# Patient Record
Sex: FEMALE | Race: OTHER
[De-identification: names, ages, dates, MRNs, and addresses within clinical notes are randomized per-mention and may not be internally consistent; named-entity substitution may affect disease eponyms.]

---

## 2017-12-07 ENCOUNTER — HOSPITAL ENCOUNTER (OUTPATIENT)
Dept: HOSPITAL 25 - OREAST | Age: 32
Discharge: HOME | End: 2017-12-07
Attending: ORTHOPAEDIC SURGERY
Payer: COMMERCIAL

## 2017-12-07 VITALS — DIASTOLIC BLOOD PRESSURE: 78 MMHG | SYSTOLIC BLOOD PRESSURE: 131 MMHG

## 2017-12-07 DIAGNOSIS — I10: ICD-10-CM

## 2017-12-07 DIAGNOSIS — E03.9: ICD-10-CM

## 2017-12-07 DIAGNOSIS — G56.01: Primary | ICD-10-CM

## 2017-12-07 DIAGNOSIS — Z87.891: ICD-10-CM

## 2017-12-07 DIAGNOSIS — G62.9: ICD-10-CM

## 2017-12-07 PROCEDURE — 81025 URINE PREGNANCY TEST: CPT

## 2017-12-13 NOTE — OP
DATE OF OPERATION:  12/07/17 [facility]

 

DATE OF BIRTH:  [birth year]

 

SURGEON:  Addy Elizondo MD.

 

ASSISTANT:  YUMI Cardona.

 

ANESTHESIOLOGIST: 

 

ANESTHESIA:  Local MAC.

 

PRE-OP DIAGNOSIS:  Right carpal tunnel syndrome.

 

POST-OP DIAGNOSIS:  Right carpal tunnel syndrome.

 

OPERATIVE PROCEDURE:  Right carpal tunnel release.

 

INDICATIONS:  Hermila had progressive carpal tunnel syndrome.  We talked about 
risks and benefits, she wanted to proceed.

 

FINDINGS:  As expected.

 

ESTIMATED BLOOD LOSS:  2 mL.

 

COMPLICATIONS:  None.

 

DESCRIPTION OF PROCEDURE:  Hermila was seen in the preoperative area.  The 
correct site, side, and procedure were identified.  We came back to the 
operating room. The arm was prepped and draped.  A time-out was performed.

 

I made a 2 to 3-cm longitudinal incision in the standard location for an open 
carpal tunnel release.  Dissection was carried down through the subcutaneous 
tissue in the palmar fascia.  Transverse carpal ligament was released off the 
radial aspect of the hook of the hamate.  The release was completed distally.  
Proximally, the subcutaneous tissue and fascia was released and retracted with 
a Sheryl retractor.  The remainder of the transverse carpal ligament and distal 
antebrachial fascia was released with tenotomy scissors under direct 
visualization.  I checked the release, once it was complete distally and 
proximally and there was absolutely no compression on the nerve, I irrigated 
out the wound.  Skin was closed with 3-0 Monocryl suture.  Wound was dressed 
with Xeroform, 4x4s, sterile Webril, and an Ace bandage.  She was then woken up 
and taken to recovery room in stable condition.

 

 [id number]/CPS #: [id number]

[facility]

## 2020-03-18 ENCOUNTER — HOSPITAL ENCOUNTER (EMERGENCY)
Dept: HOSPITAL 25 - UCCORT | Age: 35
Discharge: HOME | End: 2020-03-18
Payer: COMMERCIAL

## 2020-03-18 VITALS — DIASTOLIC BLOOD PRESSURE: 95 MMHG | SYSTOLIC BLOOD PRESSURE: 167 MMHG

## 2020-03-18 DIAGNOSIS — J06.9: Primary | ICD-10-CM

## 2020-03-18 DIAGNOSIS — Z87.891: ICD-10-CM

## 2020-03-18 DIAGNOSIS — R10.31: ICD-10-CM

## 2020-03-18 PROCEDURE — 99212 OFFICE O/P EST SF 10 MIN: CPT

## 2020-03-18 PROCEDURE — G0463 HOSPITAL OUTPT CLINIC VISIT: HCPCS

## 2020-03-18 NOTE — UC
Throat Pain/Nasal Jesus HPI





- HPI Summary


HPI Summary: 


34-year-old woman comes in with a chief complaint of upper respiratory tract 

infection symptoms for one week.  She's had green rhinorrhea or sinus pressure 

cough chest congestion.  Occasional fevers.  Started her menstrual period 

yesterday.  She started with pelvic cramping that radiates in the midline to 

the level of the umbilicus and also bilaterally.  Pain is intermittent.  At 

this time at 2 out of 10 minutes at its worse is a 10 out of 10.  Location and 

character of the pain is like her menstrual cramps but these are worse than 

usual.  She took some ibuprofen last night which did help with the pain. The 

last period was in February 2020 and the timing is normal.  Patient denies any 

chance of being pregnant.  No burning with urination.  No abnormal vaginal 

discharge.  No concern of STI or yeast infection.





- History of Current Complaint


Chief Complaint: UCAbdominalPain


Stated Complaint: COUGH,SOB,CONGESTION,LOWER ABD PAIN


Time Seen by Provider: 03/18/20 09:16


Hx Last Menstrual Period: 3/17/2020


Pain Intensity: 6





- Allergies/Home Medications


Allergies/Adverse Reactions: 


 Allergies











Allergy/AdvReac Type Severity Reaction Status Date / Time


 


No Known Allergies Allergy   Verified 03/18/20 08:51











Home Medications: 


 Home Medications





Amoxicillin PO (*) [Amoxicillin 875 MG (*)] 875 mg PO BID #20 tab 03/18/20 [Rx]


Ibuprofen TAB* [Advil TAB*] 600 mg PO Q6H PRN 03/18/20 [History Confirmed 03/18/ 20]











PMH/Surg Hx/FS Hx/Imm Hx


Previously Healthy: Yes





- Surgical History


Surgical History: Yes


Surgery Procedure, Year, and Place: Right Carpal Tunnel Release, 2017, [place]





- Family History


Known Family History: Positive: None





- Social History


Alcohol Use: None


Alcohol Amount: 2 DRINKS/YR


Substance Use Type: None


Smoking Status (MU): Former Smoker


Type: Cigarettes


Amount Used/How Often: 2 CIGARETTES/DAY FOR 2 YRS


Length of Time of Smoking/Using Tobacco: 1 pack per week x 3 Years


Have You Smoked in the Last Year: No


When Did the Patient Quit Smoking/Using Tobacco: ~2013


Household Exposure Type: Cigarettes





Review of Systems


All Other Systems Reviewed And Are Negative: Yes


Constitutional: Positive: Other - SEE HPI


Skin: Positive: Negative


Eyes: Positive: Negative


ENT: Positive: Nasal Discharge, Sinus Congestion


Respiratory: Positive: Cough


Cardiovascular: Positive: Negative


Gastrointestinal: Positive: Abdominal Pain


Genitourinary: Positive: Negative


Motor: Positive: Negative


Neurovascular: Positive: Negative


Musculoskeletal: Positive: Negative


Neurological/Mental Status: Positive: Negative


Psychological: Positive: Negative


Is Patient Immunocompromised?: No





Physical Exam


Triage Information Reviewed: Yes


Appearance: No Pain Distress, Well-Nourished, Ill-Appearing - MILD


Vital Signs: 


 Initial Vital Signs











Temp  99.3 F   03/18/20 08:48


 


Pulse  108   03/18/20 08:48


 


Resp  26   03/18/20 08:48


 


BP  167/95   03/18/20 08:48


 


Pulse Ox  100   03/18/20 08:48











Vital Signs Reviewed: Yes


Eye Exam: Normal


Eyes: Positive: Conjunctiva Clear


ENT: Positive: Pharyngeal erythema, Nasal congestion, Nasal drainage, TMs normal


Neck: Positive: Supple


Respiratory: Positive: Lungs clear, Normal breath sounds, No respiratory 

distress


Cardiovascular: Positive: RRR


Abdomen Description: Positive: Other: - Mild tenderness to palpation the 

midline over the suprapubic area and also on the right lower quadrant.  No 

rebound.  Positive bowel sounds.


Bowel Sounds: Positive: Present


Musculoskeletal: Positive: Strength Intact, ROM Intact


Neurological: Positive: Muscle Tone Normal


Psychological: Positive: Age Appropriate Behavior


Skin Exam: Normal





Throat Pain/Nasal Course/Dx





- Course


Course Of Treatment: 





DISCUSSED VIRAL VERSES BACTERIAL INFECTIONS AND THE ROLE OF ANTIBIOTICS.


THE PATIENT PREFERS TO BE ON ANTIBIOTICS AT THIS TIME.





Discussed the potential causes of the abdominal pain with the patient.  Pain is 

intermittent and bilateral and eating and drinking and bowels have not changes 

therefore appendicitis is unlikely at this time.  Also ovarian cyst or torsion 

is unlikely given that the pain is bilateral.  Ectopic pregnancy is not likely 

given that the patient has not had any sexual activity with male.  No UTI 

symptoms.  No STI symptoms.  Most probable cause of the pelvic pain is her 

menstrual cramps that are worse than usual.  We discussed all of this and the 

plan is to treat the abdominal pain with ibuprofen but if anything gets worse 

such as fevers increased her pain but she pain in the right lower quadrant or 

persistent pain or vomiting patient's crit emergency department for further 

evaluation and care.





- Differential Dx/Diagnosis


Provider Diagnosis: 


 Upper respiratory infection, Abdominal pain








Discharge ED





- Sign-Out/Discharge


Documenting (check all that apply): Patient Departure


All imaging exams completed and their final reports reviewed: No Studies





- Discharge Plan


Condition: Stable


Disposition: HOME


Prescriptions: 


Amoxicillin PO (*) [Amoxicillin 875 MG (*)] 875 mg PO BID #20 tab


Patient Education Materials:  Upper Respiratory Infection (ED), Acute Abdominal 

Pain (ED)


Forms:  *Work Release


Referrals: 


Chantell Yousif PA [Primary Care Provider] - 


Additional Instructions: 


FOLLOW UP WITH YOUR DOCTOR IF NOT COMPLETELY IMPROVED.


GO TO THE EMERGENCY DEPARTMENT IF NOT IMPROVED OR WORSE; ABDOMINAL PAIN, 

ESPECIALLY RIGHT LOWER ABDOMINAL PAIN, FEVER, VOMITING OR ANY QUESTIONS OR 

CONCERNS.








- Billing Disposition and Condition


Condition: STABLE


Disposition: Home